# Patient Record
Sex: MALE | Race: WHITE | URBAN - METROPOLITAN AREA
[De-identification: names, ages, dates, MRNs, and addresses within clinical notes are randomized per-mention and may not be internally consistent; named-entity substitution may affect disease eponyms.]

---

## 2024-07-03 ENCOUNTER — HOME HEALTH ADMISSION (OUTPATIENT)
Dept: HOME HEALTH SERVICES | Facility: HOME HEALTH | Age: 64
End: 2024-07-03
Payer: MEDICARE

## 2024-07-05 ENCOUNTER — HOME CARE VISIT (OUTPATIENT)
Dept: HOME HEALTH SERVICES | Facility: HOME HEALTH | Age: 64
End: 2024-07-05

## 2024-07-08 ENCOUNTER — HOME CARE VISIT (OUTPATIENT)
Dept: SCHEDULING | Facility: HOME HEALTH | Age: 64
End: 2024-07-08
Payer: MEDICARE

## 2024-07-08 PROCEDURE — G0151 HHCP-SERV OF PT,EA 15 MIN: HCPCS

## 2024-07-10 VITALS
OXYGEN SATURATION: 97 % | HEART RATE: 78 BPM | RESPIRATION RATE: 18 BRPM | SYSTOLIC BLOOD PRESSURE: 122 MMHG | TEMPERATURE: 98.7 F | DIASTOLIC BLOOD PRESSURE: 67 MMHG

## 2024-07-10 ASSESSMENT — ENCOUNTER SYMPTOMS: DYSPNEA ACTIVITY LEVEL: AFTER AMBULATING LESS THAN 10 FT

## 2024-07-10 NOTE — HOME HEALTH
Primary focus of care and skilled reason for admission/summary of clinical condition: polyneuropathy and ms weakness that warrants home health  Subjective:patient said he has to stop falling as he has fallen multiple times going up and down his steps and needs to get stronger  Caregiver is available: 24/7  Caregiver present at this visit and actively participate with clinician.  Medications reconciled and all medications are available in the home this visit.   The following education was provided regarding medications: medication interactions: NA Patient/CG able to demonstrate knowledge through teach back with 25% percent accuracy.    Following medication reconciliation, the physician Dr Alan notified of high risk medication interactions within 24 hours. A list of reconciled medications will be given to the patient/caregiver and a copy has been uploaded to media.    Objective data:    BED MOBILITY: minimal   TRANSFERS:minimal  GAIT:minimal assistance with 2WW   ASSISTIVE DEVICES USED CURRENTLY: 2WW  ASSISTIVE DEVICES NEEDED:NA  ADLS:min A  IADLS:min a  FUNCTIONAL STRENGTH:2-/5 on LUI LE knee extension and flexion  ROM DEFICITS:LUI knee extension   BALANCE:18/28 on Tinetti balance assessment  VESTIBULAR ASSESSMENT:WNL  PROPRIOCEPTION:WNL  OBJECTIVE TESTS (MINIMUM OF 2): TUG x 17 seconds  WOUNDS:na    Assessment of evaluation:Patient has LUI constant knee flexion with gait and with all transfers that warrants manual therapy to achieve normal AROM along with limited strength LUI secondary to gross ms atrophy as client has to go up 15 steps to enter his apartment and has fallen multiple times in entering and exiting his home. Patient said he is on the wait list to get on first floor apartment.    Patient/caregiver instructed on plan of care and are agreeable to plan of care at this time.    Clinician reviewed orientation to home health booklet with patient/caregiver including agency phone number, agency complaint

## 2024-07-12 ENCOUNTER — HOME CARE VISIT (OUTPATIENT)
Dept: SCHEDULING | Facility: HOME HEALTH | Age: 64
End: 2024-07-12
Payer: MEDICARE

## 2024-07-12 VITALS
RESPIRATION RATE: 18 BRPM | TEMPERATURE: 97.5 F | OXYGEN SATURATION: 100 % | DIASTOLIC BLOOD PRESSURE: 80 MMHG | SYSTOLIC BLOOD PRESSURE: 130 MMHG | HEART RATE: 58 BPM

## 2024-07-12 PROCEDURE — G0157 HHC PT ASSISTANT EA 15: HCPCS

## 2024-07-12 PROCEDURE — G0155 HHCP-SVS OF CSW,EA 15 MIN: HCPCS

## 2024-07-12 ASSESSMENT — ENCOUNTER SYMPTOMS
HEMOPTYSIS: 0
PAIN LOCATION - PAIN QUALITY: ACHE

## 2024-07-12 NOTE — HOME HEALTH
Primary focus of care and skilled reason for visit: MS and fall prevention  Subjective: Pt states he uses a cane in the house sometimes. He feels his equilibruim is off since being in the hospital. Pt reports being a little depressed. Pt will see psycologist on july 22nd.   Medications reconciled and all medications are available in the home this visit.   The following education was provided regarding medications: Taking as prescribed and notifying physician of any issues with medications.   Patient demonstrate knowledge through teach back with 100 percent accuracy.      GAIT: Pt amb with SPC in home 70ft, SBA and focus on improving endurance and safety. Pt home is cluttered which gives pt little path ways to navigate. Presents with occasional lateral swaying and flexed knees. VC for wider ROSARIO to improve stability for prevention of falls.   THER EX: seated therex 10reps x2 heel inocente raises, hip flexion, hip abd, LAQ with focus on improving strength for transfers, balance, and amb. Instructed pt on reaching full range. utilized exercise printouts and instructed pt to complete HEP daily. Pt verblized understanding through teach back method.   BALANCE: With NBOS instructed in static stance; 30sec and VC for maintaining forward gaze. Instructed in cervical rotation and extension x 3reps, reaching forward and laterally outside of ROSARIO. With NBOS min perturbations applied in various directions with focus on pt ability to maintain stability and right to midline. Min unsteadiness. Instructed in core activation to stabilize.   Educated pt on opening up blinds to allow light into pt home for safety so pt doesnt trip over things and to decrease feelings of depression as pt home is very dim. Educated to do things that interest pt, get out to get some fresh air to prevent getting depressed. Pt verbalized understanding through teach back.    Assessment of treatment/improvements or declines from previous treatments: Pt requires

## 2024-07-13 ENCOUNTER — HOME CARE VISIT (OUTPATIENT)
Dept: HOME HEALTH SERVICES | Facility: HOME HEALTH | Age: 64
End: 2024-07-13
Payer: MEDICARE

## 2024-07-13 NOTE — HOME HEALTH
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  These delusions do not help patient to plan realistically about moving.  Patient reports he does not have any friends or family who can help due to mother dying in hospice and being estranged from siblings.  Ex-wife Disha is currently in hospital in [place].  Patient will see psychiatrist on July 22nd at [facility].  Patient is a  but [facility] not [facility].  Patient states will not use [facility] for housing search.  Patient declined to access self-admit for [place] as suggestion if becomes homeless, however,  provided teaching verbally and in writing.  Patient stored [place] contact with address in his phone via text message.   counseled patient until he agreed to ask ex-wife for emergency housing assistance as she reportedly owns his grandmother's home locally and reports that they talk daily.  Patient is currently safe at home and accepting physical therapy, as well as, reporting dates of medical appointments and plan to attend appointments.  [place] if his extension to remain in his housing was approved by a .   provided report to Adult Protective Services to assess patient for voluntary Assisted Living placement by phone, which was rejected.   then attempted to refile report for assistance online.

## 2024-07-15 ENCOUNTER — HOME CARE VISIT (OUTPATIENT)
Dept: HOME HEALTH SERVICES | Facility: HOME HEALTH | Age: 64
End: 2024-07-15
Payer: MEDICARE

## 2024-07-16 ENCOUNTER — HOME CARE VISIT (OUTPATIENT)
Dept: HOME HEALTH SERVICES | Facility: HOME HEALTH | Age: 64
End: 2024-07-16
Payer: MEDICARE

## 2024-07-17 ENCOUNTER — HOME CARE VISIT (OUTPATIENT)
Dept: SCHEDULING | Facility: HOME HEALTH | Age: 64
End: 2024-07-17
Payer: MEDICARE

## 2024-07-18 ENCOUNTER — HOME CARE VISIT (OUTPATIENT)
Dept: HOME HEALTH SERVICES | Facility: HOME HEALTH | Age: 64
End: 2024-07-18
Payer: MEDICARE

## 2024-07-19 ENCOUNTER — HOME CARE VISIT (OUTPATIENT)
Dept: SCHEDULING | Facility: HOME HEALTH | Age: 64
End: 2024-07-19
Payer: MEDICARE

## 2024-07-20 ENCOUNTER — HOME CARE VISIT (OUTPATIENT)
Dept: HOME HEALTH SERVICES | Facility: HOME HEALTH | Age: 64
End: 2024-07-20
Payer: MEDICARE

## 2024-07-22 ENCOUNTER — HOME CARE VISIT (OUTPATIENT)
Dept: HOME HEALTH SERVICES | Facility: HOME HEALTH | Age: 64
End: 2024-07-22
Payer: MEDICARE

## 2024-07-24 ASSESSMENT — ENCOUNTER SYMPTOMS: PAIN LOCATION - PAIN QUALITY: SORE

## 2024-07-24 NOTE — HOME HEALTH
The patient missed a routine visit for PT on 7/20/24 as client wasn't home and is reported to be homeless and unable to find residient.  Rosa Elena at [facility] office was contacted on 7/22/24 at 0900) and informed of the missed visit.

## 2024-07-29 NOTE — HOME HEALTH
Client is reportedly homeless as he spoke to office and said he is trying to find shelter but isn't at the time. Patient said he is walking around town without any assistance and doesn't want anymore PT. Patient was instructed to follow up with PCP if he warrants home health in the future.

## 2024-07-29 NOTE — HOME HEALTH
attempted to call client for skilled PT discharge visit as patient said he wasn't at a home currently and he is homeless and is getting around city ok and just needs to be discharged